# Patient Record
Sex: FEMALE | Race: OTHER | HISPANIC OR LATINO | Employment: UNEMPLOYED | URBAN - METROPOLITAN AREA
[De-identification: names, ages, dates, MRNs, and addresses within clinical notes are randomized per-mention and may not be internally consistent; named-entity substitution may affect disease eponyms.]

---

## 2023-12-01 ENCOUNTER — APPOINTMENT (OUTPATIENT)
Dept: RADIOLOGY | Facility: IMAGING CENTER | Age: 23
End: 2023-12-01

## 2023-12-01 ENCOUNTER — APPOINTMENT (OUTPATIENT)
Dept: OBGYN | Facility: CLINIC | Age: 23
End: 2023-12-01

## 2023-12-01 DIAGNOSIS — N91.2 AMENORRHEA, UNSPECIFIED: ICD-10-CM

## 2023-12-01 PROCEDURE — 76805 OB US >/= 14 WKS SNGL FETUS: CPT | Mod: TC | Performed by: PHYSICIAN ASSISTANT

## 2023-12-06 ENCOUNTER — HOSPITAL ENCOUNTER (OUTPATIENT)
Facility: MEDICAL CENTER | Age: 23
End: 2023-12-06
Attending: NURSE PRACTITIONER
Payer: COMMERCIAL

## 2023-12-06 ENCOUNTER — INITIAL PRENATAL (OUTPATIENT)
Dept: OBGYN | Facility: CLINIC | Age: 23
End: 2023-12-06

## 2023-12-06 ENCOUNTER — APPOINTMENT (OUTPATIENT)
Dept: OBGYN | Facility: CLINIC | Age: 23
End: 2023-12-06
Payer: MEDICAID

## 2023-12-06 VITALS
HEIGHT: 61 IN | SYSTOLIC BLOOD PRESSURE: 114 MMHG | DIASTOLIC BLOOD PRESSURE: 54 MMHG | BODY MASS INDEX: 25.75 KG/M2 | WEIGHT: 136.4 LBS

## 2023-12-06 DIAGNOSIS — O09.32 LATE PRENATAL CARE AFFECTING PREGNANCY IN SECOND TRIMESTER: ICD-10-CM

## 2023-12-06 PROCEDURE — 0500F INITIAL PRENATAL CARE VISIT: CPT | Performed by: NURSE PRACTITIONER

## 2023-12-06 PROCEDURE — 3078F DIAST BP <80 MM HG: CPT | Performed by: NURSE PRACTITIONER

## 2023-12-06 PROCEDURE — 8198 PREG CTR PKG RATE (SYSTEM): Performed by: NURSE PRACTITIONER

## 2023-12-06 PROCEDURE — 3074F SYST BP LT 130 MM HG: CPT | Performed by: NURSE PRACTITIONER

## 2023-12-06 NOTE — LETTER
Cuente los Movimientos de irene Bebé  Otro paso importante para la sina de irene bebé    Audrey Murguia     [facility] [place]            Dept: [phone]    ¿Cuántas semanas tiene de embarazo? 27w6d    Fecha cuando tiene que comenzar a contar el movimiento: 12/6/2023                  Andreina debe usar kamini diagrama    Kenan manera en que irene doctor puede controlar a sina de irene bebé es sabiendo cuantas veces se mueve irene bebé en el útero, o por medio de las “pataditas”.  Usted podrá ayudarle a irene médico al usar cada día el siguiente diagrama.    Cada día, usted debe prestar atención a cuantas horas le lleva a irene bebé moverse 10 veces.  Comience a contar en la mañana, lo antes posible después de haberse levantado.    Primeramente, escriba la hora en que se mueve irene bebé, hasta llegar a 10 veces.  Colóquele un check o palomita a cada cuadrito cada vez que irene bebé se mueva hasta que complete 10 veces.  Escriba la hora cuando termine de contar 10 veces en la última columna.  Sume el total del tiempo que le llevó contar los 10 movimientos.  Finalmente, complete el cuadrito de cuantas horas le llevó hacerlo.    Después de conor contado los 10 movimientos, ya no tendrá que contar los demás movimientos por el soniya del día.  A la mañana siguiente, comience a contar de nuevo cuantas veces se mueve el bebé desde el momento en que se levante.    ¿Qué tendría que considerarse un “movimiento”?  Es difícil de decirlo porque es distinto de kenan madre a otra, y de un embarazo a otro.  Lo importante es que cuente el movimiento de la misma manera óscar el transcurso de irene embarazo.  Si tiene preguntas adicionales, pregúntele a irene doctor.    ¡Cuente cuidadosamente cada día!     MUESTRA:  Semana 28    ¿Cuántas horas le ha llevado sentir 10 movimientos?        Hora de Inicio     1     2     3     4     5     6     7     8     9     10   Hora de Finlizar   Ernestina. 8:20           11:40   Mar.               Mié.                Jue.               Vie.               Sáb.               Dom.                 IMPORTANTE:  Usted debe contactar a irene doctor si le lleva más de 2 horas sentir 10 movimientos de irene bebé.    Cada mañana, escriba la hora de inicio y comience a contar los movimientos de irene bebé.  Hágalo colocándole un check o palomita a cada cuadrito cada vez que sienta un movimiento de irene bebé.  Cuando haya sentido 10 “pataditas”, escriba la hora en que terminó de contar en la última columna.  Luego, complete en la cajita (arriba de la verna de check o palomita) el número total de horas que le llevó hacerlo.  Asegúrese de leer completamente las instrucciones en la página anterior.

## 2023-12-06 NOTE — PROGRESS NOTES
Pt. Here for NOB visit.  # [phone]  LMP 5/25/2023  Last pap smear pt states has never had one.   Pt had an U/S on 12/1/2023  Pt. States having + FM, kick count sheet given along with instructions.  Pt states having increase in discharge denies itching, burning or odor.   Tdap offered and would like to read information   FLU offered and would like to read information.

## 2023-12-06 NOTE — PROGRESS NOTES
Subjective:   Audrey Murguia is a 23 y.o.  who presents for her new OB exam.  She is 27w6d with an DANIKA of Estimated Date of Delivery: 24 by LMP she was tracking. She is feeling well and has no concerns at this time. Denies VB, LOF, contractions or pain. No ER visits or previous care in this pregnancy. Denies dysuria, vaginal DC, fever. Reports good fetal movement. Declines AFP.  Declines CF.  Declines NIPT testing.     History reviewed. No pertinent past medical history.    Psych Hx: Patient denies any history of depression, anxiety, PTSD, bipolar or any other psychological issues.     History reviewed. No pertinent surgical history.     OB History    Para Term  AB Living   1             SAB IAB Ectopic Molar Multiple Live Births                    # Outcome Date GA Lbr Darvin/2nd Weight Sex Delivery Anes PTL Lv   1 Current                 Gynecological Hx: Denies any hx of STIs, including HSV. Denies any vulvovaginal disorders and no hx of abnormal cervical cytology. Last pap due PP.    Sexual Hx: One current male partner, who is  FOB     Contraceptive Hx: Has not used in the past and has since discontinued use.     Family History   Problem Relation Age of Onset    Hypertension Mother     Diabetes Mother     No Known Problems Father     No Known Problems Sister     No Known Problems Brother     Hypertension Maternal Grandmother     Diabetes Maternal Grandmother      Denies any genetic disorders in family history.     Social History     Socioeconomic History    Marital status: Single     Spouse name: Not on file    Number of children: Not on file    Years of education: Not on file    Highest education level: Not on file   Occupational History    Not on file   Tobacco Use    Smoking status: Never    Smokeless tobacco: Never   Vaping Use    Vaping Use: Not on file   Substance and Sexual Activity    Alcohol use: Not Currently    Drug use: Never    Sexual activity: Yes     Partners: Male  "    Comment: none   Other Topics Concern    Not on file   Social History Narrative    Not on file     Social Determinants of Health     Financial Resource Strain: Not on file   Food Insecurity: Not on file   Transportation Needs: Not on file   Physical Activity: Not on file   Stress: Not on file   Social Connections: Not on file   Intimate Partner Violence: Not on file   Housing Stability: Not on file       FOB is involved and lives with Audrey Murguia.  Pregnancy is planned.    She is currently not working, denies any heavy lifting or exposure to potential teratogens like environmental or occupational toxins.   Denies alcohol use, drug use, or tobacco use in pregnancy.   Denies any current or hx of sexual, emotional or physical abuse or trauma.     Current Medications: PNV   Allergies: Denies allergies to medications, food, or environmental allergies.    Objective:      Vitals:    12/06/23 1105 12/06/23 1117   BP:  114/54   Weight: 136 lb 6.4 oz 136 lb 6.4 oz   Height: 5' 1.02\"         See Prenatal Physical and Prenatal Vitals      Assessment:      1.  IUP @ 27w6d per LMP      2.  S=D      3.  See problem list as follows       Patient Active Problem List    Diagnosis Date Noted    Late prenatal care affecting pregnancy in second trimester 12/06/2023         Plan:   - GC/CT/vaginal pathogens  - Unsure about flu and tdap vacc  - NIPT declined  - Prenatal labs ordered - lab slip given  - Discussed PNV, nutrition, adequate water intake, and exercise/weight gain in pregnancy  - NOB informational packet with anticipatory guidance given  - Information on Centering Pregnancy given, n/a  - S/sx of pregnancy warning signs and PTL precautions given  - Complete OB US WNL  - Return to [facility] in 3 wks   "

## 2023-12-07 ENCOUNTER — TELEPHONE (OUTPATIENT)
Dept: OBGYN | Facility: CLINIC | Age: 23
End: 2023-12-07

## 2023-12-07 LAB
C TRACH DNA GENITAL QL NAA+PROBE: NEGATIVE
CANDIDA DNA VAG QL PROBE+SIG AMP: NEGATIVE
G VAGINALIS DNA VAG QL PROBE+SIG AMP: POSITIVE
N GONORRHOEA DNA GENITAL QL NAA+PROBE: NEGATIVE
SPECIMEN SOURCE: NORMAL
T VAGINALIS DNA VAG QL PROBE+SIG AMP: NEGATIVE

## 2023-12-07 RX ORDER — METRONIDAZOLE 500 MG/1
500 TABLET ORAL 2 TIMES DAILY
Qty: 14 TABLET | Refills: 0 | Status: SHIPPED | OUTPATIENT
Start: 2023-12-07 | End: 2024-02-09

## 2023-12-07 NOTE — TELEPHONE ENCOUNTER
----- Message from EDDIE Colin sent at 12/7/2023  9:58 AM PST -----  Rx sent for metronidazole.     Called pt previously 12/7/[phone] to find out pharmacy and inform of +BV result and medication to be ordered.

## 2023-12-07 NOTE — TELEPHONE ENCOUNTER
----- Message from EDDIE Colin sent at 12/7/2023  9:33 AM PST -----  Pt needs treatment for BV in pregnancy, rx sent in.     12/7/2023 0922   ID: [id number]  Called pt and informed her test came back positive for BV, explained this is not an STI. Pt informed she needs to start antibiotics. Should take the full Tx and advised to take meds with food but not with milk and to avoid alcohol and intercourse while on Tx. Pharmacy verified with pt. Pt agreed and verbalized understanding.

## 2023-12-18 ENCOUNTER — HOSPITAL ENCOUNTER (OUTPATIENT)
Dept: LAB | Facility: MEDICAL CENTER | Age: 23
End: 2023-12-18
Attending: NURSE PRACTITIONER
Payer: COMMERCIAL

## 2023-12-18 DIAGNOSIS — O09.32 LATE PRENATAL CARE AFFECTING PREGNANCY IN SECOND TRIMESTER: ICD-10-CM

## 2023-12-18 LAB
ABO GROUP BLD: NORMAL
BLD GP AB SCN SERPL QL: NORMAL
ERYTHROCYTE [DISTWIDTH] IN BLOOD BY AUTOMATED COUNT: 42.4 FL (ref 35.9–50)
GLUCOSE 1H P 50 G GLC PO SERPL-MCNC: 105 MG/DL (ref 70–139)
HBV SURFACE AG SER QL: ABNORMAL
HCT VFR BLD AUTO: 34.5 % (ref 37–47)
HCV AB SER QL: ABNORMAL
HGB BLD-MCNC: 10.9 G/DL (ref 12–16)
HIV 1+2 AB+HIV1 P24 AG SERPL QL IA: NORMAL
MCH RBC QN AUTO: 27 PG (ref 27–33)
MCHC RBC AUTO-ENTMCNC: 31.6 G/DL (ref 32.2–35.5)
MCV RBC AUTO: 85.6 FL (ref 81.4–97.8)
PLATELET # BLD AUTO: 246 K/UL (ref 164–446)
PMV BLD AUTO: 13.2 FL (ref 9–12.9)
RBC # BLD AUTO: 4.03 M/UL (ref 4.2–5.4)
RH BLD: NORMAL
RUBV AB SER QL: 38.8 IU/ML
T PALLIDUM AB SER QL IA: ABNORMAL
WBC # BLD AUTO: 10.7 K/UL (ref 4.8–10.8)

## 2023-12-19 LAB
AMPHET CTO UR CFM-MCNC: NEGATIVE NG/ML
BARBITURATES CTO UR CFM-MCNC: NEGATIVE NG/ML
BENZODIAZ CTO UR CFM-MCNC: NEGATIVE NG/ML
CANNABINOIDS CTO UR CFM-MCNC: NEGATIVE NG/ML
COCAINE CTO UR CFM-MCNC: NEGATIVE NG/ML
CREAT UR-MCNC: 67.3 MG/DL (ref 20–400)
DRUG COMMENT 753798: NORMAL
METHADONE CTO UR CFM-MCNC: NEGATIVE NG/ML
OPIATES CTO UR CFM-MCNC: NEGATIVE NG/ML
PCP CTO UR CFM-MCNC: NEGATIVE NG/ML
PROPOXYPH CTO UR CFM-MCNC: NEGATIVE NG/ML

## 2023-12-20 LAB
BACTERIA UR CULT: NORMAL
SIGNIFICANT IND 70042: NORMAL
SITE SITE: NORMAL
SOURCE SOURCE: NORMAL

## 2024-01-03 ENCOUNTER — ROUTINE PRENATAL (OUTPATIENT)
Dept: OBGYN | Facility: CLINIC | Age: 24
End: 2024-01-03
Payer: MEDICAID

## 2024-01-03 VITALS — DIASTOLIC BLOOD PRESSURE: 60 MMHG | SYSTOLIC BLOOD PRESSURE: 106 MMHG | BODY MASS INDEX: 26.89 KG/M2 | WEIGHT: 142.4 LBS

## 2024-01-03 DIAGNOSIS — Z34.03 SUPERVISION OF NORMAL FIRST PREGNANCY IN THIRD TRIMESTER: ICD-10-CM

## 2024-01-03 PROCEDURE — 3074F SYST BP LT 130 MM HG: CPT | Performed by: ADVANCED PRACTICE MIDWIFE

## 2024-01-03 PROCEDURE — 3078F DIAST BP <80 MM HG: CPT | Performed by: ADVANCED PRACTICE MIDWIFE

## 2024-01-03 PROCEDURE — 0502F SUBSEQUENT PRENATAL CARE: CPT | Performed by: ADVANCED PRACTICE MIDWIFE

## 2024-01-03 NOTE — PROGRESS NOTES
Pt here today for OB follow up  Pt states no concerns  Reports +FM yes   Good #[phone] (home)    Pharmacy Confirmed.yes  Chaperone offered

## 2024-01-19 ENCOUNTER — ROUTINE PRENATAL (OUTPATIENT)
Dept: OBGYN | Facility: CLINIC | Age: 24
End: 2024-01-19

## 2024-01-19 VITALS — SYSTOLIC BLOOD PRESSURE: 108 MMHG | BODY MASS INDEX: 27 KG/M2 | WEIGHT: 143 LBS | DIASTOLIC BLOOD PRESSURE: 58 MMHG

## 2024-01-19 DIAGNOSIS — Z34.03 ENCOUNTER FOR SUPERVISION OF NORMAL FIRST PREGNANCY IN THIRD TRIMESTER: Primary | ICD-10-CM

## 2024-01-19 PROCEDURE — 3078F DIAST BP <80 MM HG: CPT | Performed by: NURSE PRACTITIONER

## 2024-01-19 PROCEDURE — 0502F SUBSEQUENT PRENATAL CARE: CPT | Performed by: NURSE PRACTITIONER

## 2024-01-19 PROCEDURE — 3074F SYST BP LT 130 MM HG: CPT | Performed by: NURSE PRACTITIONER

## 2024-01-19 NOTE — PROGRESS NOTES
OB follow up   + fetal movement.  No VB, LOF or UC's.  Phone # [phone]  Preferred pharmacy confirmed.  TDap/ Flu vaccine offered. Patient declined

## 2024-01-19 NOTE — PROGRESS NOTES
S:  Pt is  at 34w1d for routine OB follow up.  No concerns today. No ED or hospital visits since last seen. Reports good FM.  Denies VB, LOF, RUCs or vaginal DC.    O:  Please see above vitals.          A:  IUP at 34w1d  Patient Active Problem List    Diagnosis Date Noted    Encounter for supervision of normal first pregnancy in third trimester 2024    Late prenatal care affecting pregnancy in second trimester 2023        P:  1.  GBS @ 36 wks.          2.  Continue FKCs.          3.  Questions answered.          4.  Encouraged pt to tour L&D.          5.  Encourage adequate water intake.        6.  Discussed childbirth education, discussed carseat safety, WIC information given        7.  F/u 2 wks.

## 2024-02-01 ENCOUNTER — HOSPITAL ENCOUNTER (OUTPATIENT)
Facility: MEDICAL CENTER | Age: 24
End: 2024-02-01
Payer: COMMERCIAL

## 2024-02-01 ENCOUNTER — ROUTINE PRENATAL (OUTPATIENT)
Dept: OBGYN | Facility: CLINIC | Age: 24
End: 2024-02-01

## 2024-02-01 VITALS — WEIGHT: 147 LBS | SYSTOLIC BLOOD PRESSURE: 120 MMHG | BODY MASS INDEX: 27.75 KG/M2 | DIASTOLIC BLOOD PRESSURE: 60 MMHG

## 2024-02-01 DIAGNOSIS — Z34.03 SUPERVISION OF NORMAL FIRST PREGNANCY IN THIRD TRIMESTER: ICD-10-CM

## 2024-02-01 DIAGNOSIS — B95.1 GROUP BETA STREP POSITIVE: ICD-10-CM

## 2024-02-01 PROCEDURE — 3074F SYST BP LT 130 MM HG: CPT

## 2024-02-01 PROCEDURE — 3078F DIAST BP <80 MM HG: CPT

## 2024-02-01 PROCEDURE — 0502F SUBSEQUENT PRENATAL CARE: CPT

## 2024-02-01 RX ORDER — FERROUS SULFATE 325(65) MG
325 TABLET ORAL DAILY
Qty: 30 TABLET | Refills: 1 | Status: SHIPPED | OUTPATIENT
Start: 2024-02-01

## 2024-02-01 NOTE — PROGRESS NOTES
Pt here today for OB follow up  Pt states feeling thumping contractions.   Reports +FM  Good # [phone] (home)    Pharmacy Confirmed.  Chaperone offered and declined .      Pt states that she has the flu and sore throat and doesn't know what to take.

## 2024-02-01 NOTE — PROGRESS NOTES
S: Pt is a 23 y.o.  at 36w0d gestation here today for routine prenatal care. Reports feeling well, though has a sore throat and cold-like symptoms. Pt reports fetal movement; denies cramping, vaginal bleeding, and leaking of fluid. Denies dysuria. Denies headache, visual changes, or epigastric pain.     O: /60   Wt 147 lb    *see prenatal flowsheet*     Labs:        GCT: 105   Ultrasound: EFW 53.6%, fetal survey WNL    A: IUP at 36w0d       S=D         Patient Active Problem List    Diagnosis Date Noted    Encounter for supervision of normal first pregnancy in third trimester 2024    Late prenatal care affecting pregnancy in second trimester 2023       P:   -Discussed normal s/sx pregnancy appropriate for gestational age and labor warning signs vs general discomforts. Reviewed fetal movements appropriate for EGA and kick counts. Reinforced adequate hydration and nutrition.  -Discussed Tylenol safe in pregnancy, OTC medications and home remedies safe for flu-like symptoms  -GBS collected today  -Rx for iron, instructed to take every other day  -Discussed BV, patient denies symptoms, declines treatment at this time   -RTC in 1 weeks for routine prenatal care      Kylee Ya C.N.M.

## 2024-02-02 LAB — GP B STREP DNA SPEC QL NAA+PROBE: POSITIVE

## 2024-02-05 PROBLEM — B95.1 GROUP BETA STREP POSITIVE: Status: ACTIVE | Noted: 2024-02-05

## 2024-02-09 ENCOUNTER — ROUTINE PRENATAL (OUTPATIENT)
Dept: OBGYN | Facility: CLINIC | Age: 24
End: 2024-02-09

## 2024-02-09 VITALS — BODY MASS INDEX: 26.62 KG/M2 | SYSTOLIC BLOOD PRESSURE: 110 MMHG | DIASTOLIC BLOOD PRESSURE: 60 MMHG | WEIGHT: 141 LBS

## 2024-02-09 DIAGNOSIS — Z34.03 SUPERVISION OF NORMAL FIRST PREGNANCY IN THIRD TRIMESTER: ICD-10-CM

## 2024-02-09 PROCEDURE — 3074F SYST BP LT 130 MM HG: CPT | Performed by: ADVANCED PRACTICE MIDWIFE

## 2024-02-09 PROCEDURE — 3078F DIAST BP <80 MM HG: CPT | Performed by: ADVANCED PRACTICE MIDWIFE

## 2024-02-09 PROCEDURE — 0502F SUBSEQUENT PRENATAL CARE: CPT | Performed by: ADVANCED PRACTICE MIDWIFE

## 2024-02-09 NOTE — PROGRESS NOTES
Pt here today for OB follow up  Pt states no complaints   Reports +FM  Good # [phone]  Pharmacy Confirmed.  Chaperone offered and not indicated.   GBS positive.

## 2024-02-09 NOTE — PROGRESS NOTES
Pt is a 23 y.o.   at 37w1d  gestation here for NORM visit. She reports affirms fetal movement. Denies vaginal bleeding and denies cramping/regular contractions. She reports overall today she is feeling well. No recent ER visits since last seen. Patient reports a few days ago she had some brown discharge with increased amounts of discharge. No itching or burning. She is experiencing some intermittent pain at the top of her abdomen with pressure. Discussed GBS positive status. Adequate hydration reviewed in detail with patient. Precautions and warning signs reviewed with patient.  RTC in 1 week(s) for NORM visit.

## 2024-02-16 ENCOUNTER — ROUTINE PRENATAL (OUTPATIENT)
Dept: OBGYN | Facility: CLINIC | Age: 24
End: 2024-02-16

## 2024-02-16 VITALS — BODY MASS INDEX: 27.68 KG/M2 | DIASTOLIC BLOOD PRESSURE: 62 MMHG | SYSTOLIC BLOOD PRESSURE: 104 MMHG | WEIGHT: 146.6 LBS

## 2024-02-16 DIAGNOSIS — Z34.03 ENCOUNTER FOR SUPERVISION OF NORMAL FIRST PREGNANCY IN THIRD TRIMESTER: Primary | ICD-10-CM

## 2024-02-16 PROCEDURE — 3074F SYST BP LT 130 MM HG: CPT | Performed by: NURSE PRACTITIONER

## 2024-02-16 PROCEDURE — 3078F DIAST BP <80 MM HG: CPT | Performed by: NURSE PRACTITIONER

## 2024-02-16 PROCEDURE — 0502F SUBSEQUENT PRENATAL CARE: CPT | Performed by: NURSE PRACTITIONER

## 2024-02-16 NOTE — PROGRESS NOTES
OB follow up   + fetal movement.  No VB, LOF or UC's.  Phone # [phone]  Preferred pharmacy confirmed.  GBS positive

## 2024-02-21 ENCOUNTER — ROUTINE PRENATAL (OUTPATIENT)
Dept: OBGYN | Facility: CLINIC | Age: 24
End: 2024-02-21
Payer: MEDICAID

## 2024-02-21 VITALS — SYSTOLIC BLOOD PRESSURE: 100 MMHG | DIASTOLIC BLOOD PRESSURE: 60 MMHG | BODY MASS INDEX: 27.94 KG/M2 | WEIGHT: 148 LBS

## 2024-02-21 DIAGNOSIS — Z34.03 SUPERVISION OF NORMAL FIRST PREGNANCY IN THIRD TRIMESTER: ICD-10-CM

## 2024-02-21 PROBLEM — O09.33 LATE PRENATAL CARE AFFECTING PREGNANCY IN THIRD TRIMESTER: Status: ACTIVE | Noted: 2023-12-06

## 2024-02-21 PROCEDURE — 3078F DIAST BP <80 MM HG: CPT

## 2024-02-21 PROCEDURE — 0502F SUBSEQUENT PRENATAL CARE: CPT

## 2024-02-21 PROCEDURE — 3074F SYST BP LT 130 MM HG: CPT

## 2024-02-21 NOTE — PROGRESS NOTES
Pt here today for OBFV   +FM movemnet  No VB, LOF. Uc's   Phone number [phone] (home)   Pharmacy verified   GBS- POS     Pt states this morning she had some white looking discharge.

## 2024-02-21 NOTE — PROGRESS NOTES
S: Pt is a 23 y.o.  at 38w6d gestation here today for routine prenatal care. Reports feeling well with her belly hardening occasionally. Pt reports fetal movement; denies vaginal bleeding, and leaking of fluid. Denies dysuria. Denies headache, visual changes, or epigastric pain.     Feels ready for labor.    O: /60   Wt 148 lb    *see prenatal flowsheet*     Labs:        GCT: 105        GBS: positive    A: IUP at 38w6d       S=D         Patient Active Problem List    Diagnosis Date Noted    Group beta Strep positive 2024    Encounter for supervision of normal first pregnancy in third trimester 2024    Late prenatal care affecting pregnancy in third trimester 2023       P:   -Discussed normal s/sx pregnancy appropriate for gestational age and labor warning signs vs general discomforts. Reviewed fetal movements appropriate for EGA and kick counts. Reinforced adequate hydration and nutrition.  -Disc labor precautions, when to report to L&D  -Disc pain medication options in labor, unsure at this time.  -Offer SVE and sweep at [facility] in 1 weeks for routine prenatal care      Kylee Ya C.N.M.

## 2024-02-26 ENCOUNTER — HOSPITAL ENCOUNTER (INPATIENT)
Facility: MEDICAL CENTER | Age: 24
LOS: 2 days | End: 2024-02-28
Attending: OBSTETRICS & GYNECOLOGY | Admitting: OBSTETRICS & GYNECOLOGY
Payer: MEDICAID

## 2024-02-26 PROBLEM — Z34.90 ENCOUNTER FOR INDUCTION OF LABOR: Status: ACTIVE | Noted: 2024-02-26

## 2024-02-26 LAB
BASOPHILS # BLD AUTO: 0.5 % (ref 0–1.8)
BASOPHILS # BLD: 0.07 K/UL (ref 0–0.12)
EOSINOPHIL # BLD AUTO: 0.06 K/UL (ref 0–0.51)
EOSINOPHIL NFR BLD: 0.4 % (ref 0–6.9)
ERYTHROCYTE [DISTWIDTH] IN BLOOD BY AUTOMATED COUNT: 44.5 FL (ref 35.9–50)
HCT VFR BLD AUTO: 38.8 % (ref 37–47)
HGB BLD-MCNC: 12.1 G/DL (ref 12–16)
HOLDING TUBE BB 8507: NORMAL
IMM GRANULOCYTES # BLD AUTO: 0.09 K/UL (ref 0–0.11)
IMM GRANULOCYTES NFR BLD AUTO: 0.6 % (ref 0–0.9)
LYMPHOCYTES # BLD AUTO: 2.48 K/UL (ref 1–4.8)
LYMPHOCYTES NFR BLD: 16.8 % (ref 22–41)
MCH RBC QN AUTO: 24.7 PG (ref 27–33)
MCHC RBC AUTO-ENTMCNC: 31.2 G/DL (ref 32.2–35.5)
MCV RBC AUTO: 79.3 FL (ref 81.4–97.8)
MONOCYTES # BLD AUTO: 0.74 K/UL (ref 0–0.85)
MONOCYTES NFR BLD AUTO: 5 % (ref 0–13.4)
NEUTROPHILS # BLD AUTO: 11.32 K/UL (ref 1.82–7.42)
NEUTROPHILS NFR BLD: 76.7 % (ref 44–72)
NRBC # BLD AUTO: 0 K/UL
NRBC BLD-RTO: 0 /100 WBC (ref 0–0.2)
PLATELET # BLD AUTO: 250 K/UL (ref 164–446)
RBC # BLD AUTO: 4.89 M/UL (ref 4.2–5.4)
T PALLIDUM AB SER QL IA: NORMAL
WBC # BLD AUTO: 14.8 K/UL (ref 4.8–10.8)

## 2024-02-26 PROCEDURE — 700105 HCHG RX REV CODE 258: Mod: UD | Performed by: OBSTETRICS & GYNECOLOGY

## 2024-02-26 PROCEDURE — 304965 HCHG RECOVERY SERVICES

## 2024-02-26 PROCEDURE — A9270 NON-COVERED ITEM OR SERVICE: HCPCS | Performed by: STUDENT IN AN ORGANIZED HEALTH CARE EDUCATION/TRAINING PROGRAM

## 2024-02-26 PROCEDURE — 59409 OBSTETRICAL CARE: CPT | Performed by: PHYSICIAN ASSISTANT

## 2024-02-26 PROCEDURE — 700111 HCHG RX REV CODE 636 W/ 250 OVERRIDE (IP): Mod: UD | Performed by: OBSTETRICS & GYNECOLOGY

## 2024-02-26 PROCEDURE — 770002 HCHG ROOM/CARE - OB PRIVATE (112)

## 2024-02-26 PROCEDURE — 700102 HCHG RX REV CODE 250 W/ 637 OVERRIDE(OP): Performed by: STUDENT IN AN ORGANIZED HEALTH CARE EDUCATION/TRAINING PROGRAM

## 2024-02-26 PROCEDURE — 59409 OBSTETRICAL CARE: CPT

## 2024-02-26 PROCEDURE — 700101 HCHG RX REV CODE 250

## 2024-02-26 PROCEDURE — 36415 COLL VENOUS BLD VENIPUNCTURE: CPT

## 2024-02-26 PROCEDURE — 85025 COMPLETE CBC W/AUTO DIFF WBC: CPT

## 2024-02-26 PROCEDURE — 0KQM0ZZ REPAIR PERINEUM MUSCLE, OPEN APPROACH: ICD-10-PCS | Performed by: PHYSICIAN ASSISTANT

## 2024-02-26 PROCEDURE — 86780 TREPONEMA PALLIDUM: CPT

## 2024-02-26 RX ORDER — SODIUM CHLORIDE, SODIUM LACTATE, POTASSIUM CHLORIDE, CALCIUM CHLORIDE 600; 310; 30; 20 MG/100ML; MG/100ML; MG/100ML; MG/100ML
INJECTION, SOLUTION INTRAVENOUS PRN
Status: DISCONTINUED | OUTPATIENT
Start: 2024-02-26 | End: 2024-02-28 | Stop reason: HOSPADM

## 2024-02-26 RX ORDER — VITAMIN A ACETATE, BETA CAROTENE, ASCORBIC ACID, CHOLECALCIFEROL, .ALPHA.-TOCOPHEROL ACETATE, DL-, THIAMINE MONONITRATE, RIBOFLAVIN, NIACINAMIDE, PYRIDOXINE HYDROCHLORIDE, FOLIC ACID, CYANOCOBALAMIN, CALCIUM CARBONATE, FERROUS FUMARATE, ZINC OXIDE, CUPRIC OXIDE 3080; 12; 120; 400; 1; 1.84; 3; 20; 22; 920; 25; 200; 27; 10; 2 [IU]/1; UG/1; MG/1; [IU]/1; MG/1; MG/1; MG/1; MG/1; MG/1; [IU]/1; MG/1; MG/1; MG/1; MG/1; MG/1
1 TABLET, FILM COATED ORAL
Status: DISCONTINUED | OUTPATIENT
Start: 2024-02-26 | End: 2024-02-28 | Stop reason: HOSPADM

## 2024-02-26 RX ORDER — ACETAMINOPHEN 500 MG
1000 TABLET ORAL
Status: DISCONTINUED | OUTPATIENT
Start: 2024-02-26 | End: 2024-02-26 | Stop reason: HOSPADM

## 2024-02-26 RX ORDER — CALCIUM CARBONATE 500 MG/1
1000 TABLET, CHEWABLE ORAL EVERY 6 HOURS PRN
Status: DISCONTINUED | OUTPATIENT
Start: 2024-02-26 | End: 2024-02-28 | Stop reason: HOSPADM

## 2024-02-26 RX ORDER — METHYLERGONOVINE MALEATE 0.2 MG/ML
0.2 INJECTION INTRAVENOUS
Status: DISCONTINUED | OUTPATIENT
Start: 2024-02-26 | End: 2024-02-26 | Stop reason: HOSPADM

## 2024-02-26 RX ORDER — IBUPROFEN 800 MG/1
800 TABLET ORAL EVERY 8 HOURS PRN
Status: CANCELLED | OUTPATIENT
Start: 2024-02-26

## 2024-02-26 RX ORDER — DOCUSATE SODIUM 100 MG/1
100 CAPSULE, LIQUID FILLED ORAL 2 TIMES DAILY PRN
Status: CANCELLED | OUTPATIENT
Start: 2024-02-26

## 2024-02-26 RX ORDER — MISOPROSTOL 200 UG/1
800 TABLET ORAL
Status: DISCONTINUED | OUTPATIENT
Start: 2024-02-26 | End: 2024-02-26 | Stop reason: HOSPADM

## 2024-02-26 RX ORDER — DOCUSATE SODIUM 100 MG/1
100 CAPSULE, LIQUID FILLED ORAL 2 TIMES DAILY PRN
Status: DISCONTINUED | OUTPATIENT
Start: 2024-02-26 | End: 2024-02-28 | Stop reason: HOSPADM

## 2024-02-26 RX ORDER — BISACODYL 10 MG
10 SUPPOSITORY, RECTAL RECTAL PRN
Status: DISCONTINUED | OUTPATIENT
Start: 2024-02-26 | End: 2024-02-28 | Stop reason: HOSPADM

## 2024-02-26 RX ORDER — SIMETHICONE 125 MG
125 TABLET,CHEWABLE ORAL 4 TIMES DAILY PRN
Status: DISCONTINUED | OUTPATIENT
Start: 2024-02-26 | End: 2024-02-28 | Stop reason: HOSPADM

## 2024-02-26 RX ORDER — LIDOCAINE HYDROCHLORIDE 10 MG/ML
20 INJECTION, SOLUTION INFILTRATION; PERINEURAL
Status: DISCONTINUED | OUTPATIENT
Start: 2024-02-26 | End: 2024-02-26 | Stop reason: HOSPADM

## 2024-02-26 RX ORDER — PHYTONADIONE 2 MG/ML
INJECTION, EMULSION INTRAMUSCULAR; INTRAVENOUS; SUBCUTANEOUS
Status: COMPLETED
Start: 2024-02-26 | End: 2024-02-26

## 2024-02-26 RX ORDER — IBUPROFEN 800 MG/1
800 TABLET ORAL EVERY 8 HOURS PRN
Status: DISCONTINUED | OUTPATIENT
Start: 2024-02-26 | End: 2024-02-28 | Stop reason: HOSPADM

## 2024-02-26 RX ORDER — TERBUTALINE SULFATE 1 MG/ML
0.25 INJECTION, SOLUTION SUBCUTANEOUS
Status: DISCONTINUED | OUTPATIENT
Start: 2024-02-26 | End: 2024-02-26 | Stop reason: HOSPADM

## 2024-02-26 RX ORDER — ACETAMINOPHEN 500 MG
1000 TABLET ORAL EVERY 6 HOURS PRN
Status: DISCONTINUED | OUTPATIENT
Start: 2024-02-26 | End: 2024-02-28 | Stop reason: HOSPADM

## 2024-02-26 RX ORDER — OXYTOCIN 10 [USP'U]/ML
INJECTION, SOLUTION INTRAMUSCULAR; INTRAVENOUS
Status: ACTIVE
Start: 2024-02-26 | End: 2024-02-26

## 2024-02-26 RX ORDER — ERYTHROMYCIN 5 MG/G
OINTMENT OPHTHALMIC
Status: COMPLETED
Start: 2024-02-26 | End: 2024-02-26

## 2024-02-26 RX ORDER — OXYCODONE HYDROCHLORIDE 5 MG/1
5 TABLET ORAL EVERY 4 HOURS PRN
Status: CANCELLED | OUTPATIENT
Start: 2024-02-26

## 2024-02-26 RX ORDER — LIDOCAINE HYDROCHLORIDE 10 MG/ML
INJECTION, SOLUTION INFILTRATION; PERINEURAL
Status: COMPLETED
Start: 2024-02-26 | End: 2024-02-26

## 2024-02-26 RX ORDER — ACETAMINOPHEN 500 MG
1000 TABLET ORAL EVERY 6 HOURS PRN
Status: CANCELLED | OUTPATIENT
Start: 2024-02-26

## 2024-02-26 RX ORDER — SODIUM CHLORIDE, SODIUM LACTATE, POTASSIUM CHLORIDE, CALCIUM CHLORIDE 600; 310; 30; 20 MG/100ML; MG/100ML; MG/100ML; MG/100ML
INJECTION, SOLUTION INTRAVENOUS PRN
Status: CANCELLED | OUTPATIENT
Start: 2024-02-26

## 2024-02-26 RX ORDER — SODIUM CHLORIDE, SODIUM LACTATE, POTASSIUM CHLORIDE, CALCIUM CHLORIDE 600; 310; 30; 20 MG/100ML; MG/100ML; MG/100ML; MG/100ML
INJECTION, SOLUTION INTRAVENOUS CONTINUOUS
Status: DISCONTINUED | OUTPATIENT
Start: 2024-02-26 | End: 2024-02-28 | Stop reason: HOSPADM

## 2024-02-26 RX ORDER — OXYTOCIN 10 [USP'U]/ML
10 INJECTION, SOLUTION INTRAMUSCULAR; INTRAVENOUS
Status: DISCONTINUED | OUTPATIENT
Start: 2024-02-26 | End: 2024-02-26 | Stop reason: HOSPADM

## 2024-02-26 RX ORDER — IBUPROFEN 800 MG/1
800 TABLET ORAL
Status: DISCONTINUED | OUTPATIENT
Start: 2024-02-26 | End: 2024-02-26 | Stop reason: HOSPADM

## 2024-02-26 RX ORDER — MISOPROSTOL 200 UG/1
600 TABLET ORAL
Status: CANCELLED | OUTPATIENT
Start: 2024-02-26

## 2024-02-26 RX ADMIN — OXYTOCIN 125 ML/HR: 10 INJECTION, SOLUTION INTRAMUSCULAR; INTRAVENOUS at 11:52

## 2024-02-26 RX ADMIN — ACETAMINOPHEN 1000 MG: 500 TABLET ORAL at 13:14

## 2024-02-26 RX ADMIN — OXYTOCIN 20 UNITS: 10 INJECTION, SOLUTION INTRAMUSCULAR; INTRAVENOUS at 07:05

## 2024-02-26 RX ADMIN — IBUPROFEN 800 MG: 800 TABLET, FILM COATED ORAL at 20:38

## 2024-02-26 RX ADMIN — LIDOCAINE HYDROCHLORIDE: 10 INJECTION, SOLUTION INFILTRATION; PERINEURAL at 07:20

## 2024-02-26 RX ADMIN — OXYTOCIN 125 ML/HR: 10 INJECTION, SOLUTION INTRAMUSCULAR; INTRAVENOUS at 07:48

## 2024-02-26 ASSESSMENT — PAIN DESCRIPTION - PAIN TYPE
TYPE: ACUTE PAIN

## 2024-02-26 NOTE — PROGRESS NOTES
0700 Report received from Danielle SOSA RN. POC for recovery discussed. Fundus firm,  3 below umbilicus, bleeding scant. Care assumed.     1043 Attempted to ambulate to the bathroom. Patient reports feeling dizzy and faint. Assisted patient back into bed. Attempted to void in bed with a bedpan. Unable to void at this time.    1140 Pt transferred to postpartum via wheelchair. Two RN assist to pivot to wheelchair. Pt tolerated well. Infant in arms and FOB at side with belongings. Report given to Yuki LIRA RN. Bands verified. Care relinquished.

## 2024-02-26 NOTE — H&P
"  History and Physical    Audrey Murguia is a 23 y.o. female  -Para:     Gestational Age:  39w4d  Admitted for:   Active Labor  Admitted to  [facility] Delivery.  Patient received prenatal care: [facility]    HPI: Patient is admitted with the above mentioned Chief Complaint and States   Loss of fluid:   positive  Abdominal Pain:  negative  Uterine Contractions:  positive  Vaginal Bleeding:  negative  Fetal Movement:  normal  Patient denies fever, chills, nausea, vomiting , headache, visual disturbance, or dysuria  Patient's last menstrual period was 2023 (exact date).  Estimated Date of Delivery: 24  Final DANIKA: 2024, by Last Menstrual Period    Patient Active Problem List    Diagnosis Date Noted    Encounter for induction of labor 2024    Group beta Strep positive 2024    Encounter for supervision of normal first pregnancy in third trimester 2024    Late prenatal care affecting pregnancy in third trimester 2023       Admitting DX: Encounter for induction of labor [Z34.90]   Pregnancy Complications:  group B strep (untreated)  OB Risk Factors:   None  Labor State:    SROM and Active phase labor.    History:   has no past medical history on file.     has no past surgical history on file.    OB History    Para Term  AB Living   1             SAB IAB Ectopic Molar Multiple Live Births                    # Outcome Date GA Lbr Darvin/2nd Weight Sex Delivery Anes PTL Lv   1 Current                Medications:  No current facility-administered medications on file prior to encounter.     No current outpatient medications on file prior to encounter.       Allergies:  Patient has no known allergies.    ROS:   Neuro: negative    Cardiovascular: negative  Gastro intestinal: negative  Genitourinary: negative            Physical Exam:  /76   Pulse (!) 101   Temp 36.1 °C (97 °F) (Temporal)   Resp 18   Ht 1.549 m (5' 1\")   Wt 67.1 kg " (148 lb)   LMP 05/25/2023 (Exact Date)   BMI 27.96 kg/m²   Constitutional: healthy-appearing, Well-developed, well-nourished  No JVD: while supine  HEENT: PERRLA  Breast Exam: negative  Cardio: regular rate and rhythm  Lung: unlabored respirations, no intercostal retractions or accessory muscle use  Abdomen: abdomen is soft without significant tenderness, masses, organomegaly or guarding  Extremity: extremities, peripheral pulses and reflexes normal    Cervical Exam: 100%  Cervix Dilatation: 10  Station: positive 2  Pelvis: Normal  Fetal Assessment: Fetal heart variability: moderate  Fetal Heart Rate decelerations: variable  Fetal Heart Rate accelerations: yes  Baseline FHR: 120 per minute  Uterine contractions: regular, every 2 minutes  Estimated Fetal Weight: 3000 - 3500g      Labs:      Prenatal Results       General (Most Recent Result)       Test Value Date Time    ABO  O  12/18/23 0823    Rh  POS  12/18/23 0823    Antibody screen  NEG  12/18/23 0823    HbA1c       Chlamydia by PCR  Negative  12/06/23 1444    Gonorrhea by PCR  Negative  12/06/23 1444    RPR/Syphilus  Non-Reactive  12/18/23 0823    HSV 1/2 by PCR (non-serum)       HSV 1/2 (serum)       HSV 1       HSV 2       HPV (16)       HBsAg  Non-Reactive  12/18/23 0823    HIV-1 HIV-2 Antibodies  Non-Reactive  12/18/23 0823    Rubella  38.80 IU/mL 12/18/23 0823    Tb                 Pap Smear (Most Recent Result)       Test Value Date Time    Pap smear       Pap smear w/HPV       Pap smear w/CTNG       Pap smar w/HPV CTNG       Pap smear (reflex HPV ACUS)       Pap smear (reflex HPV ASCUS w/CTNG)       Pathology gyn specimen                 Urinalysis (Most Recent Result)       Test Value Date Time    Urinalysis       POC urinalysis       Urine drug screen (w/ conf)  (See Report)   12/18/23 0824    Urine culture ([id number])  (See Report)   12/18/23 0823    Urine Protein/Creatinine Ratio                 Urinalysis, Culture if indicated       Test Value  Date Time    Color       Appearance       Specific Gravity       PH       Glucose       Ketones       Protein       Bilirubin       Nitrites       Leukocytes Esterase       Blood       Comment       Culture                 Urine Drug Screen       Test Value Date Time    Amphetamines       Barbiturates  Negative ng/mL 12/18/23 0824    Benzodiazepines  Negative ng/mL 12/18/23 0824    Cocaine  Negative ng/mL 12/18/23 0824    Methadone  Negative ng/mL 12/18/23 0824    Opiates       Oxycodone       Phencylidine  Negative ng/mL 12/18/23 0824    Propoxyphene       Marijuana Metabolite  Negative ng/mL 12/18/23 0824              1st Trimester       Test Value Date Time    Hgb       Hct       Fasting Glucose Tolerance       GTT, 1 hour       GTT, 2 hours       GTT, 3 hours                 2nd Trimester       Test Value Date Time    Hgb       Hct       AST       ALT       Uric Acid       Fasting Glucose Tolerance       GTT, 1 hour       GTT, 2 hours       GTT, 3 hours                 3rd Trimester       Test Value Date Time    Hgb  10.9 g/dL 12/18/23 0823    Hct  34.5 % 12/18/23 0823    Platelet count  246 K/uL 12/18/23 0823    GBS (ZAMBRANO BROTH)  POSITIVE  02/01/24 1606    Fasting Glucose Tolerance       GTT, 1 hour  105 mg/dL 12/18/23 0823    GTT, 2 hous       GTT, 3hours                 Congenital Disease Screening       Test Value Date Time    First Trimester Screen       Quad Screen       BH Electrophoresis       Cystic Fibrosis Carrier Study       SMA       AFP Maternal Serum        AFP Tetra       NIPT                 Legend    ^: Historical                              Assessment:  Gestational Age:  39w4d  Labor State:   Labor, Active  Risk Factors:   group B strep colonizer  Pregnancy Complications: none    Patient Active Problem List    Diagnosis Date Noted    Encounter for induction of labor 02/26/2024    Group beta Strep positive 02/05/2024    Encounter for supervision of normal first pregnancy in third trimester  01/19/2024    Late prenatal care affecting pregnancy in third trimester 12/06/2023       Plan:   Admitted for: Active Labor, GBS pos - unable to get PCN due to precipitous delivery. See delivery note.      SANJUANITA Ford.

## 2024-02-26 NOTE — PROGRESS NOTES
0620 Report received from Tanya ORDAZ. Care Assumed. Pt transferred to S213 from [facility] 1. Shailesh BECKHAM at bedside.   0646 Spontaneous vaginal delivery of viable male infant. APGARS 7/9.   0700 Report to Kendal ORDAZ. Care Relinquished.

## 2024-02-26 NOTE — ED PROVIDER NOTES
"Emergency Obstetric Consultation     Date of Service  2024    Reason for Consultation  Chief Complaint   Patient presents with    Contractions       History of Presenting Illness  23 y.o. female who presented 2024 with Reason for consult: contractionsFetal activity: Perceived fetal activity is normal.      .    Review of Systems  Review of Systems   All other systems reviewed and are negative.      Obstetric History    OB History    Para Term  AB Living   1             SAB IAB Ectopic Molar Multiple Live Births                    # Outcome Date GA Lbr Darvin/2nd Weight Sex Delivery Anes PTL Lv   1 Current                Gynecologic History  Patient's last menstrual period was 2023 (exact date).    Medical History  No past medical history on file.    Surgical History   has no past surgical history on file.    Family History  family history includes Diabetes in her maternal grandmother and mother; Hypertension in her maternal grandmother and mother; No Known Problems in her brother, father, and sister.    Social History   reports that she has never smoked. She has never used smokeless tobacco. She reports that she does not currently use alcohol. She reports that she does not use drugs.    Medications  Medications Prior to Admission   Medication Sig Dispense Refill Last Dose    ferrous sulfate 325 (65 Fe) MG tablet Take 1 Tablet by mouth every day. 30 Tablet 1     Prenatal MV-Min-Fe Fum-FA-DHA (PRENATAL 1 PO) Take  by mouth.          Allergies  No Known Allergies    Physical Exam  Maternal Exam:  Introitus: Amniotic fluid character: clear.  Cervix: Cervix evaluated by digital exam.    C/C/+2, vtxBaseline rate: 120.         Laboratory  Recent Labs     24  0645   WBC 14.8*   RBC 4.89   HEMOGLOBIN 12.1   HEMATOCRIT 38.8   MCV 79.3*   MCH 24.7*   MCHC 31.2*   RDW 44.5   PLATELETCT 250              No results for input(s): \"NTPROBNP\" in the last 72 hours.     "     Imaging  None    Assessment & Plan  Assessment:  Active phase labor.   Membrane status: SROM.   Fetal well-being: normal.     Plan:  IUP at 39w4d - active labor, GBS+ - admit, transfer to room for imminent delivery.           SANJUANITA Ford.

## 2024-02-26 NOTE — L&D DELIVERY NOTE
DATE OF SERVICE:  2024     On 2024 at 0646, this 23-year-old  1, para 0  female with   an intrauterine pregnancy at 39 weeks and 4 days under no anesthesia has   delivered a viable male infant with Apgar scores of 7 and 9, weight is   currently pending as baby is skin to skin.  The patient was admitted in active   labor, found to be complete with a spontaneous rupture of membranes 1 hour   prior. GBS is positive, but the patient was unable to receive any doses of   penicillin prior to delivery.  The patient was transferred to the room and had   a normal spontaneous vaginal delivery to a sterile field in an occiput   anterior position.  Nuchal cord x2 was reduced after delivery of the infant.    Infant was placed on maternal abdomen with awaiting RN.  Respiratory therapy   was called and eventually came. Delayed cord clamping occurred until the cord   stopped pulsing.  The cord was clamped by myself and cut by the father of the   baby.  An intact placenta with a 3-vessel cord delivered spontaneously at   0704.  Pitocin was then started wide open in the IV.  Vagina was explored and   a second-degree perineal laceration was noted and repaired in normal fashion   with a 3-0 Vicryl Rapide under local anesthesia.  A 1% local lidocaine was   applied prior to repair.  The patient tolerated well.  Estimated blood loss is   200 mL.  The patient has excellent hemostasis and fundus was found to be   firm.        ______________________________  CLEO Ford/ARAMIS/CHAZ    DD:  2024 07:18  DT:  2024 07:34    Job#:  [id number]

## 2024-02-26 NOTE — PROGRESS NOTES
Pt is a ; DANIKA of ; making her 39.4. Pt here visually uncomfortable, reporting she needs to use the BR. SVE at [facility]. Charge, Shailesh HERNANDEZ CNM and Danielle ORDAZ notified and given report. Pt transferred to S213. IV started, partial labs drawn, report given to Danielle.

## 2024-02-27 LAB
ERYTHROCYTE [DISTWIDTH] IN BLOOD BY AUTOMATED COUNT: 46.2 FL (ref 35.9–50)
HCT VFR BLD AUTO: 29.1 % (ref 37–47)
HGB BLD-MCNC: 9 G/DL (ref 12–16)
MCH RBC QN AUTO: 25 PG (ref 27–33)
MCHC RBC AUTO-ENTMCNC: 30.9 G/DL (ref 32.2–35.5)
MCV RBC AUTO: 80.8 FL (ref 81.4–97.8)
PLATELET # BLD AUTO: 202 K/UL (ref 164–446)
PMV BLD AUTO: 13.2 FL (ref 9–12.9)
RBC # BLD AUTO: 3.6 M/UL (ref 4.2–5.4)
WBC # BLD AUTO: 12.6 K/UL (ref 4.8–10.8)

## 2024-02-27 PROCEDURE — A9270 NON-COVERED ITEM OR SERVICE: HCPCS | Performed by: STUDENT IN AN ORGANIZED HEALTH CARE EDUCATION/TRAINING PROGRAM

## 2024-02-27 PROCEDURE — 36415 COLL VENOUS BLD VENIPUNCTURE: CPT

## 2024-02-27 PROCEDURE — 770002 HCHG ROOM/CARE - OB PRIVATE (112)

## 2024-02-27 PROCEDURE — 85027 COMPLETE CBC AUTOMATED: CPT

## 2024-02-27 PROCEDURE — 700102 HCHG RX REV CODE 250 W/ 637 OVERRIDE(OP): Performed by: STUDENT IN AN ORGANIZED HEALTH CARE EDUCATION/TRAINING PROGRAM

## 2024-02-27 RX ADMIN — ACETAMINOPHEN 1000 MG: 500 TABLET ORAL at 19:25

## 2024-02-27 RX ADMIN — PRENATAL WITH FERROUS FUM AND FOLIC ACID 1 TABLET: 3080; 920; 120; 400; 22; 1.84; 3; 20; 10; 1; 12; 200; 27; 25; 2 TABLET ORAL at 09:36

## 2024-02-27 RX ADMIN — IBUPROFEN 800 MG: 800 TABLET, FILM COATED ORAL at 16:03

## 2024-02-27 RX ADMIN — ACETAMINOPHEN 1000 MG: 500 TABLET ORAL at 09:36

## 2024-02-27 ASSESSMENT — PAIN DESCRIPTION - PAIN TYPE
TYPE: ACUTE PAIN

## 2024-02-27 NOTE — PROGRESS NOTES
Assumed patient care. Pt is resting comfortably with baby in crib. Pt rates pain 3/10. Patient assessment completed. Discussed infant bulb suction and emergency call light. POC reviewed with patient all questions answered. Will continue to monitor, call light in reach.

## 2024-02-27 NOTE — PROGRESS NOTES
"Obstetrics & Gynecology Post-Delivery Progress Note    Date of Service  2024    23 y.o.  s/p vaginal, spontaneous  Delivery date: 2024    Events  GBS untreated    Subjective  Pain: No  Bleeding: lochia moderate, \"heavier than a period\"  PO's: taking regular diet  Voiding: without difficulty  Ambulating: yes  Passing flatus: Yes  Feeding: breastfeeding well, expressing and syringe feeding    Objective  Temp:  [36.1 °C (97 °F)-37.3 °C (99.2 °F)] 36.5 °C (97.7 °F)  Pulse:  [101-106] 104  Resp:  [18-20] 18  BP: (106-120)/(60-76) 106/61  SpO2:  [97 %-100 %] 100 %    Physical Exam  General: well  Chest/Breasts: nipples intact   Abdomen: nontender  Fundus: at umbilicus  Incision: not applicable, (vaginal delivery)  Perineum: deferred  Extremities: symmetric, calves nontender    Recent Labs     24  0645   WBC 14.8*   RBC 4.89   HEMOGLOBIN 12.1   HEMATOCRIT 38.8   MCV 79.3*   MCH 24.7*   RDW 44.5   PLATELETCT 250   NEUTSPOLYS 76.70*   LYMPHOCYTES 16.80*   MONOCYTES 5.00   EOSINOPHILS 0.40   BASOPHILS 0.50       Assessment/Plan  Audrey Murguia is a 23 y.o.yo  s/p postpartum day #1  s/p vaginal, spontaneous    1. Post care: meeting all goals  2. Hemodynamics: awaiting CBC  3. Pain: controlled  4. Rh+, Rubella Immune  5. Method of Feeding: plans to breastfeed  6. Method of Contraception:  not addressed  7. Disposition: likely home postpartum day 2 d/t GBS not treated    VTE prophylaxis: none indicated   "

## 2024-02-27 NOTE — PROGRESS NOTES
1140: Received report from Kendal Thornton. Patient transferred to postpartum bed and alert.    Patient assessment completed, plan of care reviewed and Verbalized understanding. Oriented patient to call light, paperwork, unit, room and answered all other questions.

## 2024-02-27 NOTE — CARE PLAN
The patient is Stable - Low risk of patient condition declining or worsening    Shift Goals  Clinical Goals: VSS, lochia light, fundus firm  Patient Goals: bond with infant  Family Goals: support    Progress made toward(s) clinical / shift goals:  Pt is able to care for self and infant. Pt is bonding with infant. Fundus is firm and lochia is scant.       Problem: Knowledge Deficit - Postpartum  Goal: Patient will verbalize and demonstrate understanding of self and infant care  Outcome: Progressing     Problem: Psychosocial - Postpartum  Goal: Patient will verbalize and demonstrate effective bonding and parenting behavior  Outcome: Progressing     Problem: Altered Physiologic Condition  Goal: Patient physiologically stable as evidenced by normal lochia, palpable uterine involution and vitals within normal limits  Outcome: Progressing     Problem: Infection - Postpartum  Goal: Postpartum patient will be free of signs and symptoms of infection  Outcome: Progressing       Patient is not progressing towards the following goals:

## 2024-02-27 NOTE — CARE PLAN
The patient is Stable - Low risk of patient condition declining or worsening    Shift Goals  Clinical Goals: Monitor VS and fundus  Patient Goals: Bond with infant and improve feedings  Family Goals: Support    Progress made toward(s) clinical / shift goals:      Problem: Knowledge Deficit - L&D  Goal: Patient and family/caregivers will demonstrate understanding of plan of care, disease process/condition, diagnostic tests and medications throughout shift as seen by pt stating she understands POC   Outcome: Progressing  Note: Discussed POC including lactation evaluation/assistance. Discussed hearing test on baby. Discussed assessments on mom and baby. Discussed pain management and inc mobility as tolerating. Pt states understanding in poc      Problem: Pain - Standard  Goal: Alleviation of pain or a reduction in pain to the patient’s comfort goal as seen by patient stating relief when having medications administered.   Outcome: Progressing  Note: Pt discussed pain management available and PRNs available in MAR. Discussed non pharmacological methods for comfort like repositioning, tucks and spray. Mother states that she does feel relief when these are used.        Patient is not progressing towards the following goals:

## 2024-02-27 NOTE — CARE PLAN
The patient is Stable - Low risk of patient condition declining or worsening    Shift Goals  Clinical Goals: pain control  Patient Goals: rest  Family Goals: emotional support    Progress made toward(s) clinical / shift goals:  Patient will verbalize and demonstrate an understanding of infants care and self care needs.     Patient will remain free from any signs or symptoms of infection.   Patient is not progressing towards the following goals:

## 2024-02-28 ENCOUNTER — PHARMACY VISIT (OUTPATIENT)
Dept: PHARMACY | Facility: MEDICAL CENTER | Age: 24
End: 2024-02-28
Payer: COMMERCIAL

## 2024-02-28 VITALS
HEART RATE: 80 BPM | HEIGHT: 61 IN | BODY MASS INDEX: 27.94 KG/M2 | DIASTOLIC BLOOD PRESSURE: 66 MMHG | OXYGEN SATURATION: 99 % | TEMPERATURE: 97.5 F | RESPIRATION RATE: 18 BRPM | WEIGHT: 148 LBS | SYSTOLIC BLOOD PRESSURE: 96 MMHG

## 2024-02-28 PROCEDURE — 700102 HCHG RX REV CODE 250 W/ 637 OVERRIDE(OP): Performed by: STUDENT IN AN ORGANIZED HEALTH CARE EDUCATION/TRAINING PROGRAM

## 2024-02-28 PROCEDURE — A9270 NON-COVERED ITEM OR SERVICE: HCPCS | Performed by: STUDENT IN AN ORGANIZED HEALTH CARE EDUCATION/TRAINING PROGRAM

## 2024-02-28 RX ORDER — IBUPROFEN 800 MG/1
800 TABLET ORAL EVERY 8 HOURS PRN
Qty: 30 TABLET | Refills: 0 | Status: SHIPPED | OUTPATIENT
Start: 2024-02-28

## 2024-02-28 RX ORDER — PSEUDOEPHEDRINE HCL 30 MG
100 TABLET ORAL 2 TIMES DAILY PRN
Qty: 60 CAPSULE | Refills: 0 | Status: SHIPPED | OUTPATIENT
Start: 2024-02-28

## 2024-02-28 RX ORDER — FERROUS SULFATE 325(65) MG
325 TABLET ORAL DAILY
Qty: 30 TABLET | Refills: 0 | Status: SHIPPED | OUTPATIENT
Start: 2024-02-28

## 2024-02-28 RX ADMIN — PRENATAL WITH FERROUS FUM AND FOLIC ACID 1 TABLET: 3080; 920; 120; 400; 22; 1.84; 3; 20; 10; 1; 12; 200; 27; 25; 2 TABLET ORAL at 08:27

## 2024-02-28 RX ADMIN — IBUPROFEN 800 MG: 800 TABLET, FILM COATED ORAL at 15:34

## 2024-02-28 RX ADMIN — ACETAMINOPHEN 1000 MG: 500 TABLET ORAL at 08:27

## 2024-02-28 ASSESSMENT — EDINBURGH POSTNATAL DEPRESSION SCALE (EPDS)
I HAVE BLAMED MYSELF UNNECESSARILY WHEN THINGS WENT WRONG: NO, NEVER
I HAVE FELT SCARED OR PANICKY FOR NO GOOD REASON: NO, NOT AT ALL
THINGS HAVE BEEN GETTING ON TOP OF ME: NO, MOST OF THE TIME I HAVE COPED QUITE WELL
I HAVE BEEN ANXIOUS OR WORRIED FOR NO GOOD REASON: NO, NOT AT ALL
I HAVE BEEN SO UNHAPPY THAT I HAVE BEEN CRYING: NO, NEVER
THE THOUGHT OF HARMING MYSELF HAS OCCURRED TO ME: NEVER
I HAVE FELT SAD OR MISERABLE: NO, NOT AT ALL
I HAVE BEEN ABLE TO LAUGH AND SEE THE FUNNY SIDE OF THINGS: AS MUCH AS I ALWAYS COULD
I HAVE BEEN SO UNHAPPY THAT I HAVE HAD DIFFICULTY SLEEPING: NOT AT ALL
I HAVE LOOKED FORWARD WITH ENJOYMENT TO THINGS: AS MUCH AS I EVER DID

## 2024-02-28 ASSESSMENT — PAIN DESCRIPTION - PAIN TYPE
TYPE: ACUTE PAIN

## 2024-02-28 NOTE — LACTATION NOTE
Initial Lactation Consultation:     services:Language Line [facility]    Met with Audrey and her baby boy to provide lactation support. Audrey has been combination feeding her baby, per her preference. She reports difficulty in latching baby to the breast, and is requesting assistance with latch. Baby has been formula feeding every three hours.    Infant awakened to elicit feeding cues at this time; he is placed skin-to-skin with mother. Hand expression demonstrated for easily expressible colostrum. Lactation consultant attempted to assist with latch onto left breast, using cross-cradle hold. Infant drowsy and falls asleep prior to latching at breast.    We reviewed the option to begin pumping for additional breast stimulation, due to sub-optimal latching. Audrey is interested in a trial of a a manual pump at this time    Audrey is encouraged to call for assistance with next feeding.     Feeding plan:     Cue-based breastfeeding, at least once every three hours. If unable to achieve optimal latch, follow feed at breast with pumping and supplemental feeding with expressed breast milk or formula, as per supplemental feeding guidelines.    Audrey is provided with the opportunity to ask questions. These have been answered to her satisfaction. She is encouraged to call RN/lactation for additional breastfeeding assistance, as needed, throughout remainder of hospital stay.       Encouraged follow up with [facility] for outpatient lactation support.   [facility] Breastfeeding Resource list provided.

## 2024-02-28 NOTE — CARE PLAN
The patient is Stable - Low risk of patient condition declining or worsening    Shift Goals  Clinical Goals: VSS, BF, pain management  Patient Goals: rooming in, bonding  Family Goals: support, bonding    Progress made toward(s) clinical / shift goals:    Problem: Knowledge Deficit - L&D  Goal: Patient and family/caregivers will demonstrate understanding of plan of care, disease process/condition, diagnostic tests and medications  Outcome: Progressing     Problem: Pain - Standard  Goal: Alleviation of pain or a reduction in pain to the patient’s comfort goal  Outcome: Progressing     Problem: Knowledge Deficit - Standard  Goal: Patient and family/care givers will demonstrate understanding of plan of care, disease process/condition, diagnostic tests and medications  Outcome: Progressing     Problem: Knowledge Deficit - Postpartum  Goal: Patient will verbalize and demonstrate understanding of self and infant care  Outcome: Progressing     Problem: Psychosocial - Postpartum  Goal: Patient will verbalize and demonstrate effective bonding and parenting behavior  Outcome: Progressing     Problem: Altered Physiologic Condition  Goal: Patient physiologically stable as evidenced by normal lochia, palpable uterine involution and vitals within normal limits  Outcome: Progressing     Problem: Infection - Postpartum  Goal: Postpartum patient will be free of signs and symptoms of infection  Outcome: Progressing     Problem: Respiratory/Oxygenation Function Post-Surgical  Goal: Patient will achieve/maintain normal respiratory rate/effort  Outcome: Progressing     Problem: Bowel Elimination - Post Surgical  Goal: Patient will resume regular bowel sounds and function with no discomfort or distention  Outcome: Progressing     Problem: Early Mobilization - Post Surgery  Goal: Early mobilization post surgery  Outcome: Progressing

## 2024-02-28 NOTE — DISCHARGE INSTRUCTIONS

## 2024-02-28 NOTE — LACTATION NOTE
Follow up lactation visit:    Audrey reports that she has continued to be unable to latch baby at breast throughout the day. Latch attempt with assistance of lactation consultant also unsuccessful. Patient is offered a trial of a nipple shield. Risks and benefits reviewed, including recommendation for initiation of three step feeding plan. Patient accepts. 20mm shield applied; infant latched easily to shield.     RN to initiate pumping following infant's feed at breast.    Feeding plan:    Three-step feeding plan, as follows:    Cue-based breastfeeding, at least once every three hours, for a total of 8+ feedings per 24 hours. If infant is unable to optimally latch at breast directly, apply nipple shield.  Provide supplementation every three hours, via paced bottle feeding, as per supplemental feeding guidelines. Offer expressed breast milk first, if available, then follow with volumes of formula/DBM to meet total volume required.  Pump breasts with double electric breast pump following each supplemental bottle feeding.    Audrey is encouraged to call for additional breastfeeding assistance, as needed, throughout remainder of hospital stay.

## 2024-02-28 NOTE — CARE PLAN
The patient is Stable - Low risk of patient condition declining or worsening    Shift Goals  Clinical Goals: maintain normal lochia  Patient Goals: pain controlled, rest  Family Goals: support, bonding    Progress made toward(s) clinical / shift goals:    Problem: Psychosocial - Postpartum  Goal: Patient will verbalize and demonstrate effective bonding and parenting behavior  Outcome: Progressing  Note: Depression screening and frequent assessment of needs and discharge education     Problem: Altered Physiologic Condition  Goal: Patient physiologically stable as evidenced by normal lochia, palpable uterine involution and vitals within normal limits  Outcome: Progressing  Note: Monitor VS and VSS       Patient is not progressing towards the following goals:

## 2024-02-28 NOTE — PROGRESS NOTES
Received report from ORLIN Ren at change of shift. Patient assessed and POC discussed. Patient is resting in bed. Fundus firm, lochia light.  Patient denies any needs at this time. Call light within reach, bed in lowest position. Patient is encouraged to call for pain/med interventions and any other needs.

## 2024-02-28 NOTE — LACTATION NOTE
This note was copied from a baby's chart.  Discussed breast care with mother as she has evidence of some mild inflammation to her breasts. Noted diffuse pinkness and warmth. Some easily reduced small firm nodules are palpable. Ice packs applied, massage techniques demonstrated.     Using nipple shield for baby. Technique reinforced with YURY Cantor present and translating [place] to English.     Hand expressing and pumping reviewed for milk stimulation and complete emptying of breasts.    Written information provided to parents in [place] regarding pumping and maximizing milk supply.    Baby Julio Cesar latched bilaterally and nursed with audible swallows for approximately 35 minutes. Burping and detaching from breast practiced with parents. Instructions in [place] on written handouts for :nipple shield use and care, latching, breast care for engorgement, milk storage and  hygiene practices. Audrey was able to demonstrate nipple shield application and latch following instruction.    Breastfeeding plan:    Feed baby with feeding cues and at least a minimum of 8x/24 hours.  Expect cluster feeding as this is normal during early days of life and growth spurts.  It is not recommended to let baby sleep longer than 4 hours between feedings and if sleepy, place skin to skin to promote feeding interest and milk production.  Baby's usually feed more frequently and longer when skin to skin with mother. It is not recommended to use pacifiers or supplementing with formula until breast feeding and milk production is well established or at least 4 weeks.    Make sure infant is getting enough by ensuring frequent feedings as well as looking for transitioning stools from dark meconium to bright yellow/green seedy loose stools by day 5.     Follow up with PEDS PCP as scheduled for weight checks and to make sure feeding is progressing appropriately.    Call for breastfeeding for 1:1 lactation consultation through [facility] as needed  and attend the [facility] without need for appointment.

## 2024-02-28 NOTE — DISCHARGE SUMMARY
Discharge Summary:      Audrey Murguia    Admit Date:   2024  Discharge Date:  2024     Admitting diagnosis:  Encounter for induction of labor [Z34.90]  Discharge Diagnosis: Status post vaginal, spontaneous.  Pregnancy Complications: group B strep (untreated)  Tubal Ligation:  no        History:  No past medical history on file.  OB History    Para Term  AB Living   1 1 1     1   SAB IAB Ectopic Molar Multiple Live Births           0 1      # Outcome Date GA Lbr Darvin/2nd Weight Sex Delivery Anes PTL Lv   1 Term 24 39w4d 02:20 / 00:26 3.37 kg (7 lb 6.9 oz) M Vag-Spont Local N REYNOLD        Patient has no known allergies.  Patient Active Problem List    Diagnosis Date Noted    Postpartum care following vaginal delivery 2024    Encounter for induction of labor 2024    Group beta Strep positive 2024    Encounter for supervision of normal first pregnancy in third trimester 2024    Late prenatal care affecting pregnancy in third trimester 2023        Hospital Course:   23 y.o. , now para 1, was admitted with the above mentioned diagnosis, underwent Active Labor, vaginal, spontaneous. Patient postpartum course was unremarkable, with progressive advancement in diet , ambulation and toleration of oral analgesia. Patient without complaints today and desires discharge.      Vitals:    24 1800 24 0543 24 1700 24 0600   BP: 106/61 97/59 113/63 96/66   Pulse: (!) 104 100 89 80   Resp: 18 18 20 18   Temp: 36.5 °C (97.7 °F) 36.9 °C (98.4 °F) 37 °C (98.6 °F) 36.4 °C (97.5 °F)   TempSrc: Temporal Temporal Temporal Temporal   SpO2: 100% 97% 97% 99%   Weight:       Height:           Current Facility-Administered Medications   Medication Dose    LR infusion      oxytocin (Pitocin) infusion (for post delivery)  125 mL/hr    lactated ringers infusion      docusate sodium (Colace) capsule 100 mg  100 mg    ibuprofen (Motrin) tablet 800 mg   800 mg    acetaminophen (Tylenol) tablet 1,000 mg  1,000 mg    tetanus-dipth-acell pertussis (Tdap) inj 0.5 mL  0.5 mL    PRN oxytocin (PITOCIN) (20 Units/1000 mL) PRN for excessive uterine bleeding - See Admin Instr  125-999 mL/hr    bisacodyl (Dulcolax) suppository 10 mg  10 mg    magnesium hydroxide (Milk Of Magnesia) suspension 30 mL  30 mL    prenatal plus vitamin (Stuartnatal 1+1) 27-1 MG tablet 1 Tablet  1 Tablet    simethicone (Mylicon) chewable tablet 125 mg  125 mg    calcium carbonate (Tums) chewable tab 1,000 mg  1,000 mg       Exam:  Breast Exam: negative  Abdomen: Abdomen soft, non-tender. BS normal. No masses,  No organomegaly  Fundus Non Tender: yes  Incision: none  Perineum: perineum intact  Extremity: extremities, peripheral pulses and reflexes normal     Labs:  Recent Labs     02/26/24  0645 02/27/24  0609   WBC 14.8* 12.6*   RBC 4.89 3.60*   HEMOGLOBIN 12.1 9.0*   HEMATOCRIT 38.8 29.1*   MCV 79.3* 80.8*   MCH 24.7* 25.0*   MCHC 31.2* 30.9*   RDW 44.5 46.2   PLATELETCT 250 202   MPV  --  13.2*        Activity:   Discharge to home  Pelvic Rest x 6 weeks    Assessment:  normal postpartum course - asymptomatic anemia, pt aware to take PNV and will send 30 days of PO FeSO4  Discharge Assessment: No areas of skin breakdown/redness; surgical incision intact/healing     Follow up: [facility] or [facility] in 5 weeks for vaginal ; 1 week for incision check.      Discharge Meds:   Current Outpatient Medications   Medication Sig Dispense Refill    docusate sodium 100 MG Cap Take 100 mg by mouth 2 times a day as needed for Constipation. 60 Capsule 0    ibuprofen (MOTRIN) 800 MG Tab Take 1 Tablet by mouth every 8 hours as needed for Mild Pain. 30 Tablet 0       Tamsen ANTIONE Arellano, P.A.

## 2024-02-29 NOTE — PROGRESS NOTES
Discharge instructions reviewed with patient by ORLIN Danielson. Follow up appointments and information reviewed. All questions and concerns answered and addressed.

## 2024-03-03 ENCOUNTER — PHARMACY VISIT (OUTPATIENT)
Dept: PHARMACY | Facility: MEDICAL CENTER | Age: 24
End: 2024-03-03
Payer: COMMERCIAL

## 2024-03-03 PROCEDURE — RXMED WILLOW AMBULATORY MEDICATION CHARGE: Performed by: PHYSICIAN ASSISTANT

## 2024-04-05 ENCOUNTER — POST PARTUM (OUTPATIENT)
Dept: OBGYN | Facility: CLINIC | Age: 24
End: 2024-04-05

## 2024-04-05 ENCOUNTER — HOSPITAL ENCOUNTER (OUTPATIENT)
Facility: MEDICAL CENTER | Age: 24
End: 2024-04-05
Attending: NURSE PRACTITIONER
Payer: COMMERCIAL

## 2024-04-05 VITALS — DIASTOLIC BLOOD PRESSURE: 64 MMHG | SYSTOLIC BLOOD PRESSURE: 90 MMHG | BODY MASS INDEX: 24.94 KG/M2 | WEIGHT: 132 LBS

## 2024-04-05 PROCEDURE — 0503F POSTPARTUM CARE VISIT: CPT | Performed by: NURSE PRACTITIONER

## 2024-04-05 PROCEDURE — 96372 THER/PROPH/DIAG INJ SC/IM: CPT | Performed by: NURSE PRACTITIONER

## 2024-04-05 PROCEDURE — 3078F DIAST BP <80 MM HG: CPT | Performed by: NURSE PRACTITIONER

## 2024-04-05 PROCEDURE — 3074F SYST BP LT 130 MM HG: CPT | Performed by: NURSE PRACTITIONER

## 2024-04-05 RX ORDER — MEDROXYPROGESTERONE ACETATE 150 MG/ML
150 INJECTION, SUSPENSION INTRAMUSCULAR ONCE
Status: COMPLETED | OUTPATIENT
Start: 2024-04-05 | End: 2024-04-05

## 2024-04-05 RX ADMIN — MEDROXYPROGESTERONE ACETATE 150 MG: 150 INJECTION, SUSPENSION INTRAMUSCULAR at 14:42

## 2024-04-05 ASSESSMENT — EDINBURGH POSTNATAL DEPRESSION SCALE (EPDS)
I HAVE BEEN ABLE TO LAUGH AND SEE THE FUNNY SIDE OF THINGS: AS MUCH AS I ALWAYS COULD
I HAVE BEEN ANXIOUS OR WORRIED FOR NO GOOD REASON: NO, NOT AT ALL
THE THOUGHT OF HARMING MYSELF HAS OCCURRED TO ME: NEVER
I HAVE BEEN SO UNHAPPY THAT I HAVE HAD DIFFICULTY SLEEPING: NOT AT ALL
I HAVE FELT SAD OR MISERABLE: NO, NOT AT ALL
I HAVE BEEN SO UNHAPPY THAT I HAVE BEEN CRYING: ONLY OCCASIONALLY
I HAVE LOOKED FORWARD WITH ENJOYMENT TO THINGS: AS MUCH AS I EVER DID
TOTAL SCORE: 1
THINGS HAVE BEEN GETTING ON TOP OF ME: NO, I HAVE BEEN COPING AS WELL AS EVER
I HAVE FELT SCARED OR PANICKY FOR NO GOOD REASON: NO, NOT AT ALL
I HAVE BLAMED MYSELF UNNECESSARILY WHEN THINGS WENT WRONG: NO, NEVER

## 2024-04-05 ASSESSMENT — ENCOUNTER SYMPTOMS
RESPIRATORY NEGATIVE: 1
CARDIOVASCULAR NEGATIVE: 1
MUSCULOSKELETAL NEGATIVE: 1
CONSTITUTIONAL NEGATIVE: 1
PSYCHIATRIC NEGATIVE: 1
GASTROINTESTINAL NEGATIVE: 1
NEUROLOGICAL NEGATIVE: 1
EYES NEGATIVE: 1

## 2024-04-05 NOTE — PROGRESS NOTES
Pt is here today for postpartum visit.   Delivery type : 2/26/24 @39w4d via vag spont  Currently formula feeding .   LMP : PP bleeding   BCM : Discuss options   Last pap : Never   EPDS : 1  Pt states she has no concerns.      ID : [id number]      Date of last Depo Provera Injection : N/A   Current date within therapeutic range? :  N/A   Urine pregnancy test done (needed if out of date range) : No intercourse since delivery   Date of office last visit : 2/21/24  Date of last pap (if > 21 years old)/ GYN exam : Today   Dx: Contraceptive use   Order and dose verified by : Scanner   Consent form signed ? : Yes   Patient tolerated injection and no adverse effects were observed or reported : Yes   # of Administrations remaining in MAR : 0  Next injection due between : 6/21/24 - 7/5/24  Given in : L Deltoid

## 2024-04-05 NOTE — PROGRESS NOTES
Subjective     Audrey Murguia is a 23 y.o. female who presents with No chief complaint on file.            S   22 y/o now  s/p  on 24 of baby without complications. Second degree laceration. Now 6 weeks postpartum. Prenatal course significant for late prenatal care and GBS positive. Postpartum course without any complications. Feeling well and happy with baby, denies any severe mood swings or s/sx of postpartum depression and anxiety.     Baby is doing well, formula exclusively.  Has not resumed sexual activity.  Mother reports no issues with bowel or bladder routine, continued regular diet. Bleeding since birth has subsided at this time with no return to menses. No vaginal pain/odor/itching, fever, headaches, dizziness/SOB or dysuria. Desires depo for contraception.     O  See PE: Physical exam today with no abnormal findings  Vital signs WNL: BP and weight as notated  PAP: first one due today     A  Reassuring exam of lactating postpartum woman s/p  on 24     P  - Verbal consent for Depo for contraception today//follow up with health department or [facility] or [facility] or [facility] as per insurance or with us for [facility] jose  - Reviewed quick start depo and back up method for 7 days  - Resumption of sexual activity: safe sex precautions given  - Counseling on nutrition, adequate hydration, and exercise   - Kegel Exercises for pelvic floor/prevention of urinary incontinence   - EPDS: WNL  - Continue PNV for breastfeeding mother  - Counseling on PAP guidelines re: next PAP due today   - Warning s/sx of postpartum infection, depression, preeclampsia   - RTC PRN                Review of Systems   Constitutional: Negative.    HENT: Negative.     Eyes: Negative.    Respiratory: Negative.     Cardiovascular: Negative.    Gastrointestinal: Negative.    Genitourinary: Negative.    Musculoskeletal: Negative.    Skin: Negative.    Neurological: Negative.    Endo/Heme/Allergies: Negative.     Psychiatric/Behavioral: Negative.                Objective     LMP 05/25/2023 (Exact Date)      Physical Exam  Constitutional:       Appearance: Normal appearance.   HENT:      Nose: Nose normal.   Cardiovascular:      Rate and Rhythm: Normal rate and regular rhythm.      Pulses: Normal pulses.      Heart sounds: Normal heart sounds.   Pulmonary:      Effort: Pulmonary effort is normal.   Genitourinary:     General: Normal vulva.   Musculoskeletal:         General: Normal range of motion.      Cervical back: Normal range of motion.   Skin:     General: Skin is warm and dry.   Neurological:      Mental Status: She is alert and oriented to person, place, and time.   Psychiatric:         Mood and Affect: Mood normal.         Behavior: Behavior normal.         Thought Content: Thought content normal.         Judgment: Judgment normal.                             Assessment & Plan        Postpartum care following vaginal delivery

## 2024-04-10 LAB
C TRACH RRNA CVX QL NAA+PROBE: NEGATIVE
COMMENT NL11729A: NORMAL
CYTOLOGIST CVX/VAG CYTO: NORMAL
CYTOLOGY CVX/VAG DOC CYTO: NORMAL
CYTOLOGY CVX/VAG DOC THIN PREP: NORMAL
N GONORRHOEA RRNA CVX QL NAA+PROBE: NEGATIVE
NOTE NL11727A: NORMAL
OTHER STN SPEC: NORMAL
STAT OF ADQ CVX/VAG CYTO-IMP: NORMAL